# Patient Record
Sex: FEMALE | Race: WHITE | Employment: OTHER | URBAN - NONMETROPOLITAN AREA
[De-identification: names, ages, dates, MRNs, and addresses within clinical notes are randomized per-mention and may not be internally consistent; named-entity substitution may affect disease eponyms.]

---

## 2023-12-07 PROBLEM — E87.6 HYPOKALEMIA: Status: ACTIVE | Noted: 2023-12-07

## 2023-12-07 PROBLEM — R13.10 DYSPHAGIA: Status: ACTIVE | Noted: 2023-12-07

## 2023-12-07 PROBLEM — D68.59 THROMBOPHILIA (MULTI): Status: ACTIVE | Noted: 2023-12-07

## 2023-12-07 PROBLEM — E03.9 ADULT HYPOTHYROIDISM: Status: ACTIVE | Noted: 2023-12-07

## 2023-12-07 PROBLEM — R47.01 APHASIA: Status: ACTIVE | Noted: 2023-12-07

## 2023-12-07 PROBLEM — M32.9 LUPUS (SYSTEMIC LUPUS ERYTHEMATOSUS) (MULTI): Status: ACTIVE | Noted: 2023-12-07

## 2023-12-07 PROBLEM — K59.00 CONSTIPATION: Status: ACTIVE | Noted: 2023-12-07

## 2023-12-07 PROBLEM — I10 ACCELERATED HYPERTENSION: Status: ACTIVE | Noted: 2023-12-07

## 2023-12-07 PROBLEM — J96.90 RESPIRATORY FAILURE (MULTI): Status: ACTIVE | Noted: 2023-12-07

## 2023-12-07 PROBLEM — F39 MOOD DISORDER (CMS-HCC): Status: ACTIVE | Noted: 2023-12-07

## 2023-12-07 PROBLEM — M62.50 MUSCLE WASTING: Status: ACTIVE | Noted: 2023-12-07

## 2023-12-07 PROBLEM — F41.9 ANXIETY: Status: ACTIVE | Noted: 2023-12-07

## 2023-12-07 PROBLEM — I61.3: Status: ACTIVE | Noted: 2023-12-07

## 2023-12-07 PROBLEM — E78.5 HYPERLIPEMIA: Status: ACTIVE | Noted: 2023-12-07

## 2023-12-07 PROBLEM — R48.9 SYMBOLIC DYSFUNCTION: Status: ACTIVE | Noted: 2023-12-07

## 2023-12-07 PROBLEM — G81.90 HEMIPLEGIA (MULTI): Status: ACTIVE | Noted: 2023-12-07

## 2023-12-07 PROBLEM — D64.9 ANEMIA: Status: ACTIVE | Noted: 2023-12-07

## 2023-12-07 PROBLEM — E11.9 DIABETES (MULTI): Status: ACTIVE | Noted: 2023-12-07

## 2023-12-07 PROBLEM — K94.20 GASTROSTOMY COMPLICATION (MULTI): Status: ACTIVE | Noted: 2023-12-07

## 2023-12-07 RX ORDER — LACTULOSE 10 G/15ML
20 SOLUTION ORAL
COMMUNITY

## 2023-12-07 RX ORDER — ATORVASTATIN CALCIUM 10 MG/1
10 TABLET, FILM COATED ORAL DAILY
COMMUNITY

## 2023-12-07 RX ORDER — LISINOPRIL 20 MG/1
20 TABLET ORAL DAILY
COMMUNITY

## 2023-12-07 RX ORDER — VIT C/E/ZN/COPPR/LUTEIN/ZEAXAN 250MG-90MG
25 CAPSULE ORAL DAILY
COMMUNITY

## 2023-12-07 RX ORDER — LEVOTHYROXINE SODIUM 137 UG/1
137 TABLET ORAL
COMMUNITY

## 2023-12-07 RX ORDER — AMLODIPINE BESYLATE 5 MG/1
5 TABLET ORAL DAILY
COMMUNITY

## 2023-12-07 RX ORDER — LAMOTRIGINE 25 MG/1
25 TABLET ORAL
COMMUNITY

## 2023-12-07 RX ORDER — DOXAZOSIN 2 MG/1
2 TABLET ORAL NIGHTLY
COMMUNITY

## 2023-12-07 RX ORDER — DOCUSATE SODIUM 100 MG/1
100 CAPSULE, LIQUID FILLED ORAL 2 TIMES DAILY
COMMUNITY

## 2023-12-07 RX ORDER — LEVOCETIRIZINE DIHYDROCHLORIDE 5 MG/1
5 TABLET, FILM COATED ORAL EVERY EVENING
COMMUNITY

## 2023-12-07 RX ORDER — WARFARIN 4 MG/1
4 TABLET ORAL
COMMUNITY

## 2023-12-07 RX ORDER — BUSPIRONE HYDROCHLORIDE 10 MG/1
5 TABLET ORAL 3 TIMES DAILY
COMMUNITY

## 2023-12-07 RX ORDER — ONDANSETRON 4 MG/1
4 TABLET, FILM COATED ORAL EVERY 8 HOURS PRN
COMMUNITY

## 2023-12-07 RX ORDER — GABAPENTIN 100 MG/1
100 CAPSULE ORAL 3 TIMES DAILY
COMMUNITY

## 2023-12-22 ENCOUNTER — OFFICE VISIT (OUTPATIENT)
Dept: SURGERY | Facility: CLINIC | Age: 53
End: 2023-12-22
Payer: COMMERCIAL

## 2023-12-22 VITALS
WEIGHT: 118 LBS | BODY MASS INDEX: 21.71 KG/M2 | TEMPERATURE: 98.1 F | HEIGHT: 62 IN | HEART RATE: 91 BPM | SYSTOLIC BLOOD PRESSURE: 97 MMHG | DIASTOLIC BLOOD PRESSURE: 75 MMHG

## 2023-12-22 DIAGNOSIS — M53.3 COCCYDYNIA: Primary | ICD-10-CM

## 2023-12-22 PROCEDURE — 1036F TOBACCO NON-USER: CPT | Performed by: SURGERY

## 2023-12-22 PROCEDURE — 3074F SYST BP LT 130 MM HG: CPT | Performed by: SURGERY

## 2023-12-22 PROCEDURE — 3078F DIAST BP <80 MM HG: CPT | Performed by: SURGERY

## 2023-12-22 PROCEDURE — 4010F ACE/ARB THERAPY RXD/TAKEN: CPT | Performed by: SURGERY

## 2023-12-22 PROCEDURE — 99204 OFFICE O/P NEW MOD 45 MIN: CPT | Performed by: SURGERY

## 2023-12-22 NOTE — PATIENT INSTRUCTIONS
My exam of the rectovaginal area was entirely unremarkable I do not see any obvious fistula or fistula or enterocele or bowel prolapse.   I would recommend following you conservatively I do not see any indication for surgical intervention based on her multiple comorbidities.  Risks far outweigh the benefits of any intervention which she is not needed.  The coccydynia can be treated with a pillow.

## 2023-12-22 NOTE — PROGRESS NOTES
Subjective   Patient ID: Beverley Andrea is a 53 y.o. female who presents for rectal pain.    HPI     The patient is not able to give a accurate history due to her multiple CVAs and aphasia.  She apparently has pain in the region of her rectum at the site of previous episiotomy.  For because she apparently has a bowel protruding from her previous episiotomy site per her sister accompanying her.  The patient is sitting in the chair.  She claims that nonspecific intermittent pain in the region of her tailbone.  She has no abdominal complaints patient not vomiting or nauseated.  She does have a clotting disorder.    History reviewed. No pertinent past medical history.     Current Outpatient Medications on File Prior to Visit   Medication Sig Dispense Refill    amLODIPine (Norvasc) 5 mg tablet Take 1 tablet (5 mg) by mouth once daily.      atorvastatin (Lipitor) 10 mg tablet Take 1 tablet (10 mg) by mouth once daily.      busPIRone (Buspar) 10 mg tablet Take 0.5 tablets (5 mg) by mouth 3 times a day.      cholecalciferol (Vitamin D-3) 25 MCG (1000 UT) capsule Take 1 capsule (25 mcg) by mouth once daily.      docusate sodium (Colace) 100 mg capsule Take 1 capsule (100 mg) by mouth 2 times a day.      doxazosin (Cardura) 2 mg tablet Take 1 tablet (2 mg) by mouth once daily at bedtime.      gabapentin (Neurontin) 100 mg capsule Take 1 capsule (100 mg) by mouth 3 times a day.      lactulose 20 gram/30 mL oral solution Take 30 mL (20 g) by mouth.      lamoTRIgine (LaMICtal) 25 mg tablet Take 1 tablet (25 mg) by mouth.      levocetirizine (Xyzal) 5 mg tablet Take 1 tablet (5 mg) by mouth once daily in the evening.      levothyroxine (Synthroid, Levoxyl) 137 mcg tablet Take 1 tablet (137 mcg) by mouth once daily in the morning. Take before meals.      lisinopril 20 mg tablet Take 1 tablet (20 mg) by mouth once daily.      ondansetron (Zofran) 4 mg tablet Take 1 tablet (4 mg) by mouth every 8 hours if needed for nausea or vomiting.       warfarin (Coumadin) 4 mg tablet Take 1 tablet (4 mg) by mouth. Take as directed per After Visit Summary.       No current facility-administered medications on file prior to visit.        Review of Systems   Unable to perform ROS: Other       Vitals:    12/22/23 1022   BP: 97/75   Pulse: 91   Temp: 36.7 °C (98.1 °F)        Objective     Physical Exam  Vitals reviewed. Exam conducted with a chaperone present.   Constitutional:       Appearance: Normal appearance. She is cachectic.      Comments: Evidence of previous cerebrovascular accidents.   HENT:      Head: Normocephalic.      Nose: Nose normal.      Mouth/Throat:      Pharynx: Oropharynx is clear.   Cardiovascular:      Rate and Rhythm: Normal rate and regular rhythm.      Heart sounds: Normal heart sounds.   Pulmonary:      Effort: Pulmonary effort is normal.      Breath sounds: Normal breath sounds.   Abdominal:      General: Abdomen is flat.      Palpations: Abdomen is soft. There is no mass.      Tenderness: There is no abdominal tenderness. There is no guarding.      Hernia: No hernia is present.      Comments: The patient has an indwelling PEG tube.  The rectal examination is unremarkable.  There is no obvious fistula there is no fissure.   Genitourinary:     Comments: Vaginal examination was unremarkable.  There is a possibility of uterine prolapse which was not present at this time.  Musculoskeletal:         General: Normal range of motion.      Cervical back: Normal range of motion.   Skin:     General: Skin is warm.   Neurological:      General: No focal deficit present.      Cranial Nerves: Dysarthria present.      Motor: Weakness present.      Gait: Gait abnormal.      Comments: The patient has evidence of a CVA.  She is very contracted.  She is not able to walk or move.   Psychiatric:         Mood and Affect: Mood normal.         Problem List Items Addressed This Visit       Coccydynia - Primary        Assessment/Plan   Complex patient with  previous multiple CVAs, clotting disorder with apparent possible prolapse or fistula.  Plan-my exam of the rectovaginal area was entirely unremarkable I do not see any obvious fistula or fistula or enterocele or bowel prolapse.   I would recommend treating the patient conservatively I do not see any indication for surgical intervention based on her multiple comorbidities.  Risks far outweigh the benefits of any intervention which she is not needed.  The coccydynia can be treated with a pillow.    Marcin Roca MD